# Patient Record
Sex: FEMALE | Race: BLACK OR AFRICAN AMERICAN | NOT HISPANIC OR LATINO | Employment: FULL TIME | URBAN - METROPOLITAN AREA
[De-identification: names, ages, dates, MRNs, and addresses within clinical notes are randomized per-mention and may not be internally consistent; named-entity substitution may affect disease eponyms.]

---

## 2024-02-23 ENCOUNTER — OFFICE VISIT (OUTPATIENT)
Dept: INTERNAL MEDICINE | Facility: CLINIC | Age: 69
End: 2024-02-23
Payer: MEDICARE

## 2024-02-23 ENCOUNTER — LAB VISIT (OUTPATIENT)
Dept: LAB | Facility: HOSPITAL | Age: 69
End: 2024-02-23
Attending: FAMILY MEDICINE
Payer: MEDICARE

## 2024-02-23 VITALS
HEART RATE: 91 BPM | DIASTOLIC BLOOD PRESSURE: 90 MMHG | OXYGEN SATURATION: 97 % | SYSTOLIC BLOOD PRESSURE: 158 MMHG | TEMPERATURE: 98 F | BODY MASS INDEX: 24.01 KG/M2 | HEIGHT: 62 IN | WEIGHT: 130.5 LBS

## 2024-02-23 DIAGNOSIS — Z12.11 COLON CANCER SCREENING: ICD-10-CM

## 2024-02-23 DIAGNOSIS — Z78.0 POSTMENOPAUSAL: ICD-10-CM

## 2024-02-23 DIAGNOSIS — Z12.31 ENCOUNTER FOR SCREENING MAMMOGRAM FOR MALIGNANT NEOPLASM OF BREAST: ICD-10-CM

## 2024-02-23 DIAGNOSIS — E03.9 HYPOTHYROIDISM, UNSPECIFIED TYPE: ICD-10-CM

## 2024-02-23 DIAGNOSIS — Z00.00 ROUTINE ADULT HEALTH MAINTENANCE: Primary | ICD-10-CM

## 2024-02-23 DIAGNOSIS — Z00.00 ROUTINE ADULT HEALTH MAINTENANCE: ICD-10-CM

## 2024-02-23 LAB
ALBUMIN SERPL BCP-MCNC: 4.3 G/DL (ref 3.5–5.2)
ALP SERPL-CCNC: 63 U/L (ref 55–135)
ALT SERPL W/O P-5'-P-CCNC: 10 U/L (ref 10–44)
ANION GAP SERPL CALC-SCNC: 12 MMOL/L (ref 8–16)
AST SERPL-CCNC: 14 U/L (ref 10–40)
BASOPHILS # BLD AUTO: 0.09 K/UL (ref 0–0.2)
BASOPHILS NFR BLD: 1.2 % (ref 0–1.9)
BILIRUB SERPL-MCNC: 0.6 MG/DL (ref 0.1–1)
BUN SERPL-MCNC: 15 MG/DL (ref 8–23)
CALCIUM SERPL-MCNC: 10.4 MG/DL (ref 8.7–10.5)
CHLORIDE SERPL-SCNC: 105 MMOL/L (ref 95–110)
CHOLEST SERPL-MCNC: 230 MG/DL (ref 120–199)
CHOLEST/HDLC SERPL: 3.2 {RATIO} (ref 2–5)
CO2 SERPL-SCNC: 25 MMOL/L (ref 23–29)
CREAT SERPL-MCNC: 1.6 MG/DL (ref 0.5–1.4)
DIFFERENTIAL METHOD BLD: ABNORMAL
EOSINOPHIL # BLD AUTO: 0.1 K/UL (ref 0–0.5)
EOSINOPHIL NFR BLD: 1.2 % (ref 0–8)
ERYTHROCYTE [DISTWIDTH] IN BLOOD BY AUTOMATED COUNT: 12.9 % (ref 11.5–14.5)
EST. GFR  (NO RACE VARIABLE): 35.3 ML/MIN/1.73 M^2
GLUCOSE SERPL-MCNC: 92 MG/DL (ref 70–110)
HCT VFR BLD AUTO: 43.6 % (ref 37–48.5)
HDLC SERPL-MCNC: 73 MG/DL (ref 40–75)
HDLC SERPL: 31.7 % (ref 20–50)
HGB BLD-MCNC: 14.4 G/DL (ref 12–16)
IMM GRANULOCYTES # BLD AUTO: 0.03 K/UL (ref 0–0.04)
IMM GRANULOCYTES NFR BLD AUTO: 0.4 % (ref 0–0.5)
LDLC SERPL CALC-MCNC: 137 MG/DL (ref 63–159)
LYMPHOCYTES # BLD AUTO: 1.9 K/UL (ref 1–4.8)
LYMPHOCYTES NFR BLD: 25.8 % (ref 18–48)
MCH RBC QN AUTO: 33 PG (ref 27–31)
MCHC RBC AUTO-ENTMCNC: 33 G/DL (ref 32–36)
MCV RBC AUTO: 100 FL (ref 82–98)
MONOCYTES # BLD AUTO: 0.5 K/UL (ref 0.3–1)
MONOCYTES NFR BLD: 7.5 % (ref 4–15)
NEUTROPHILS # BLD AUTO: 4.6 K/UL (ref 1.8–7.7)
NEUTROPHILS NFR BLD: 63.9 % (ref 38–73)
NONHDLC SERPL-MCNC: 157 MG/DL
NRBC BLD-RTO: 0 /100 WBC
PLATELET # BLD AUTO: 286 K/UL (ref 150–450)
PMV BLD AUTO: 11.4 FL (ref 9.2–12.9)
POTASSIUM SERPL-SCNC: 4.4 MMOL/L (ref 3.5–5.1)
PROT SERPL-MCNC: 7.5 G/DL (ref 6–8.4)
RBC # BLD AUTO: 4.37 M/UL (ref 4–5.4)
SODIUM SERPL-SCNC: 142 MMOL/L (ref 136–145)
T4 FREE SERPL-MCNC: 0.87 NG/DL (ref 0.71–1.51)
TRIGL SERPL-MCNC: 100 MG/DL (ref 30–150)
TSH SERPL DL<=0.005 MIU/L-ACNC: 13.39 UIU/ML (ref 0.4–4)
WBC # BLD AUTO: 7.24 K/UL (ref 3.9–12.7)

## 2024-02-23 PROCEDURE — 80053 COMPREHEN METABOLIC PANEL: CPT | Performed by: FAMILY MEDICINE

## 2024-02-23 PROCEDURE — 99999 PR PBB SHADOW E&M-NEW PATIENT-LVL III: CPT | Mod: PBBFAC,,, | Performed by: FAMILY MEDICINE

## 2024-02-23 PROCEDURE — 36415 COLL VENOUS BLD VENIPUNCTURE: CPT | Mod: PO | Performed by: FAMILY MEDICINE

## 2024-02-23 PROCEDURE — 84439 ASSAY OF FREE THYROXINE: CPT | Performed by: FAMILY MEDICINE

## 2024-02-23 PROCEDURE — 99203 OFFICE O/P NEW LOW 30 MIN: CPT | Mod: PBBFAC,PO | Performed by: FAMILY MEDICINE

## 2024-02-23 PROCEDURE — 85025 COMPLETE CBC W/AUTO DIFF WBC: CPT | Performed by: FAMILY MEDICINE

## 2024-02-23 PROCEDURE — 99204 OFFICE O/P NEW MOD 45 MIN: CPT | Mod: S$PBB,,, | Performed by: FAMILY MEDICINE

## 2024-02-23 PROCEDURE — 80061 LIPID PANEL: CPT | Performed by: FAMILY MEDICINE

## 2024-02-23 PROCEDURE — 84443 ASSAY THYROID STIM HORMONE: CPT | Performed by: FAMILY MEDICINE

## 2024-02-23 RX ORDER — LEVOTHYROXINE SODIUM 75 UG/1
125 TABLET ORAL
COMMUNITY
Start: 2023-11-21 | End: 2024-02-23

## 2024-02-23 RX ORDER — ASPIRIN 81 MG/1
81 TABLET ORAL
COMMUNITY

## 2024-02-23 RX ORDER — LEVOTHYROXINE SODIUM 125 UG/1
125 TABLET ORAL
Qty: 90 TABLET | Refills: 1 | Status: SHIPPED | OUTPATIENT
Start: 2024-02-23 | End: 2025-02-22

## 2024-02-25 NOTE — PROGRESS NOTES
"Subjective:      Patient ID: Althea Rowland is a 68 y.o. female.    Chief Complaint: Establish Care      Patient here for routine physical.  Reports it has been several years since she has seen a doctor.  History of hypothyroidism, has been out of levothyroxine for several months      Review of Systems  Past Medical History:   Diagnosis Date    Brain aneurysm     Hypothyroidism           Past Surgical History:   Procedure Laterality Date     SECTION       History reviewed. No pertinent family history.  Social History     Socioeconomic History    Marital status:    Tobacco Use    Smoking status: Every Day     Types: Cigarettes     Review of patient's allergies indicates:   Allergen Reactions    Yellow dye Itching       Objective:       BP (!) 158/90 (BP Location: Right arm, Patient Position: Sitting, BP Method: Large (Manual))   Pulse 91   Temp 97.6 °F (36.4 °C) (Tympanic)   Ht 5' 2" (1.575 m)   Wt 59.2 kg (130 lb 8.2 oz)   SpO2 97%   BMI 23.87 kg/m²   Physical Exam  Assessment:     1. Routine adult health maintenance    2. Hypothyroidism, unspecified type    3. Colon cancer screening    4. Postmenopausal    5. Encounter for screening mammogram for malignant neoplasm of breast      Plan:   Routine adult health maintenance  -     Comprehensive Metabolic Panel; Future; Expected date: 2024  -     CBC Auto Differential; Future; Expected date: 2024  -     Lipid Panel; Future; Expected date: 2024  -     T4, Free; Future; Expected date: 2024  -     TSH; Future; Expected date: 2024  -     DXA Bone Density Axial Skeleton 1 or more sites; Future; Expected date: 2024  -     Mammo Digital Screening Bilat w/ Valentin; Future; Expected date: 2024    Hypothyroidism, unspecified type  -     CBC Auto Differential; Future; Expected date: 2024  -     Lipid Panel; Future; Expected date: 2024  -     T4, Free; Future; Expected date: 2024  -     TSH; Future; " Expected date: 02/23/2024    Colon cancer screening    Postmenopausal  -     DXA Bone Density Axial Skeleton 1 or more sites; Future; Expected date: 02/23/2024    Encounter for screening mammogram for malignant neoplasm of breast  -     Mammo Digital Screening Bilat w/ Valentin; Future; Expected date: 02/23/2024    Other orders  -     levothyroxine (SYNTHROID) 125 MCG tablet; Take 1 tablet (125 mcg total) by mouth before breakfast.  Dispense: 90 tablet; Refill: 1      Medication List with Changes/Refills   New Medications    LEVOTHYROXINE (SYNTHROID) 125 MCG TABLET    Take 1 tablet (125 mcg total) by mouth before breakfast.   Current Medications    ASPIRIN (ECOTRIN) 81 MG EC TABLET    Take 81 mg by mouth.    MULTIVITAMIN WITH MINERALS TABLET    Take 1 tablet by mouth once daily.   Discontinued Medications    LEVOTHYROXINE (SYNTHROID) 75 MCG TABLET    Take 125 mcg by mouth before breakfast.

## 2024-02-29 ENCOUNTER — OFFICE VISIT (OUTPATIENT)
Dept: INTERNAL MEDICINE | Facility: CLINIC | Age: 69
End: 2024-02-29
Payer: MEDICARE

## 2024-02-29 ENCOUNTER — LAB VISIT (OUTPATIENT)
Dept: LAB | Facility: HOSPITAL | Age: 69
End: 2024-02-29
Attending: FAMILY MEDICINE
Payer: MEDICARE

## 2024-02-29 VITALS
TEMPERATURE: 96 F | BODY MASS INDEX: 24.42 KG/M2 | OXYGEN SATURATION: 98 % | SYSTOLIC BLOOD PRESSURE: 153 MMHG | DIASTOLIC BLOOD PRESSURE: 99 MMHG | HEIGHT: 62 IN | WEIGHT: 132.69 LBS | HEART RATE: 84 BPM

## 2024-02-29 DIAGNOSIS — N28.9 RENAL INSUFFICIENCY: ICD-10-CM

## 2024-02-29 DIAGNOSIS — N28.9 RENAL INSUFFICIENCY: Primary | ICD-10-CM

## 2024-02-29 DIAGNOSIS — R35.0 URINARY FREQUENCY: ICD-10-CM

## 2024-02-29 DIAGNOSIS — I10 HYPERTENSION, UNSPECIFIED TYPE: ICD-10-CM

## 2024-02-29 LAB
AMORPH CRY UR QL COMP ASSIST: ABNORMAL
ANION GAP SERPL CALC-SCNC: 12 MMOL/L (ref 8–16)
BACTERIA #/AREA URNS AUTO: ABNORMAL /HPF
BILIRUB UR QL STRIP: NEGATIVE
BUN SERPL-MCNC: 19 MG/DL (ref 8–23)
CALCIUM SERPL-MCNC: 9.8 MG/DL (ref 8.7–10.5)
CHLORIDE SERPL-SCNC: 103 MMOL/L (ref 95–110)
CLARITY UR REFRACT.AUTO: ABNORMAL
CO2 SERPL-SCNC: 28 MMOL/L (ref 23–29)
COLOR UR AUTO: ABNORMAL
CREAT SERPL-MCNC: 1.2 MG/DL (ref 0.5–1.4)
EST. GFR  (NO RACE VARIABLE): 49.9 ML/MIN/1.73 M^2
GLUCOSE SERPL-MCNC: 72 MG/DL (ref 70–110)
GLUCOSE UR QL STRIP: NEGATIVE
HGB UR QL STRIP: NEGATIVE
KETONES UR QL STRIP: NEGATIVE
LEUKOCYTE ESTERASE UR QL STRIP: ABNORMAL
MICROSCOPIC COMMENT: ABNORMAL
NITRITE UR QL STRIP: NEGATIVE
PH UR STRIP: 6 [PH] (ref 5–8)
POTASSIUM SERPL-SCNC: 3.9 MMOL/L (ref 3.5–5.1)
PROT UR QL STRIP: ABNORMAL
SODIUM SERPL-SCNC: 143 MMOL/L (ref 136–145)
SP GR UR STRIP: 1.02 (ref 1–1.03)
SQUAMOUS #/AREA URNS AUTO: 13 /HPF
URN SPEC COLLECT METH UR: ABNORMAL
WBC #/AREA URNS AUTO: 9 /HPF (ref 0–5)

## 2024-02-29 PROCEDURE — 99213 OFFICE O/P EST LOW 20 MIN: CPT | Mod: PBBFAC,PO | Performed by: FAMILY MEDICINE

## 2024-02-29 PROCEDURE — 99999 PR PBB SHADOW E&M-EST. PATIENT-LVL III: CPT | Mod: PBBFAC,,, | Performed by: FAMILY MEDICINE

## 2024-02-29 PROCEDURE — 36415 COLL VENOUS BLD VENIPUNCTURE: CPT | Mod: PO | Performed by: FAMILY MEDICINE

## 2024-02-29 PROCEDURE — 99214 OFFICE O/P EST MOD 30 MIN: CPT | Mod: S$PBB,,, | Performed by: FAMILY MEDICINE

## 2024-02-29 PROCEDURE — 80048 BASIC METABOLIC PNL TOTAL CA: CPT | Performed by: FAMILY MEDICINE

## 2024-02-29 PROCEDURE — 87086 URINE CULTURE/COLONY COUNT: CPT | Performed by: FAMILY MEDICINE

## 2024-02-29 PROCEDURE — 81001 URINALYSIS AUTO W/SCOPE: CPT | Performed by: FAMILY MEDICINE

## 2024-02-29 RX ORDER — AMLODIPINE BESYLATE 5 MG/1
5 TABLET ORAL DAILY
Qty: 30 TABLET | Refills: 11 | Status: SHIPPED | OUTPATIENT
Start: 2024-02-29 | End: 2024-03-24 | Stop reason: SDUPTHER

## 2024-02-29 NOTE — PROGRESS NOTES
"Subjective:      Patient ID: Althea Rowland is a 68 y.o. female.    Chief Complaint: Follow-up      The patient is here today for routine follow up. Labs drawn earlier discussed today with the patient.  GFR 35 which is new, only previous comparison in  her GFR was 110  Her TSH was elevated as expected as she had been out of her levothyroxine for several months  Blood pressure still elevated today even on recheck    Follow-up      Review of Systems   Constitutional:  Negative for activity change and appetite change.   Genitourinary:  Positive for frequency and urgency.     Past Medical History:   Diagnosis Date    Brain aneurysm 2004    Hypothyroidism           Past Surgical History:   Procedure Laterality Date     SECTION       History reviewed. No pertinent family history.  Social History     Socioeconomic History    Marital status:    Tobacco Use    Smoking status: Every Day     Types: Cigarettes    Smokeless tobacco: Never     Review of patient's allergies indicates:   Allergen Reactions    Yellow dye Itching       Objective:       BP (!) 153/99 (BP Location: Right arm, Patient Position: Sitting, BP Method: Medium (Automatic))   Pulse 84   Temp 96.3 °F (35.7 °C) (Tympanic)   Ht 5' 2" (1.575 m)   Wt 60.2 kg (132 lb 11.5 oz)   SpO2 98%   BMI 24.27 kg/m²   Physical Exam  Constitutional:       General: She is not in acute distress.     Appearance: Normal appearance. She is well-developed. She is not ill-appearing or diaphoretic.   Neurological:      Mental Status: She is alert and oriented to person, place, and time.   Psychiatric:         Mood and Affect: Mood normal.         Behavior: Behavior normal.         Thought Content: Thought content normal.         Judgment: Judgment normal.       Assessment:     1. Renal insufficiency    2. Hypertension, unspecified type    3. Urinary frequency      Plan:   Renal insufficiency  -     Basic Metabolic Panel; Future; Expected date: " 02/29/2024    Hypertension, unspecified type    Urinary frequency  -     Urinalysis; Future; Expected date: 02/29/2024  -     Urine culture; Future; Expected date: 02/29/2024    Other orders  -     amLODIPine (NORVASC) 5 MG tablet; Take 1 tablet (5 mg total) by mouth once daily.  Dispense: 30 tablet; Refill: 11    Will check urine today, recheck renal function, plan follow-up on thyroid levels in about 3 months  Will start Norvasc-2 week blood pressure nurse visit    Medication List with Changes/Refills   New Medications    AMLODIPINE (NORVASC) 5 MG TABLET    Take 1 tablet (5 mg total) by mouth once daily.   Current Medications    ASPIRIN (ECOTRIN) 81 MG EC TABLET    Take 81 mg by mouth.    LEVOTHYROXINE (SYNTHROID) 125 MCG TABLET    Take 1 tablet (125 mcg total) by mouth before breakfast.    MULTIVITAMIN WITH MINERALS TABLET    Take 1 tablet by mouth once daily.

## 2024-03-01 DIAGNOSIS — E03.9 HYPOTHYROIDISM, UNSPECIFIED TYPE: ICD-10-CM

## 2024-03-01 DIAGNOSIS — N28.9 RENAL INSUFFICIENCY: Primary | ICD-10-CM

## 2024-03-02 LAB — BACTERIA UR CULT: NO GROWTH

## 2024-03-14 ENCOUNTER — HOSPITAL ENCOUNTER (OUTPATIENT)
Dept: RADIOLOGY | Facility: HOSPITAL | Age: 69
Discharge: HOME | End: 2024-03-14
Attending: FAMILY MEDICINE
Payer: MEDICARE

## 2024-03-14 ENCOUNTER — CLINICAL SUPPORT (OUTPATIENT)
Dept: INTERNAL MEDICINE | Facility: CLINIC | Age: 69
End: 2024-03-14
Payer: MEDICARE

## 2024-03-14 VITALS — DIASTOLIC BLOOD PRESSURE: 98 MMHG | SYSTOLIC BLOOD PRESSURE: 162 MMHG | HEART RATE: 77 BPM

## 2024-03-14 DIAGNOSIS — Z12.31 ENCOUNTER FOR SCREENING MAMMOGRAM FOR MALIGNANT NEOPLASM OF BREAST: ICD-10-CM

## 2024-03-14 DIAGNOSIS — Z00.00 ROUTINE ADULT HEALTH MAINTENANCE: ICD-10-CM

## 2024-03-14 DIAGNOSIS — I10 HYPERTENSION, UNSPECIFIED TYPE: Primary | ICD-10-CM

## 2024-03-14 PROCEDURE — 99999 PR PBB SHADOW E&M-EST. PATIENT-LVL I: CPT | Mod: PBBFAC,,,

## 2024-03-14 PROCEDURE — 77067 SCR MAMMO BI INCL CAD: CPT | Mod: 26,,, | Performed by: RADIOLOGY

## 2024-03-14 PROCEDURE — 77063 BREAST TOMOSYNTHESIS BI: CPT | Mod: TC

## 2024-03-14 PROCEDURE — 99211 OFF/OP EST MAY X REQ PHY/QHP: CPT | Mod: PBBFAC,25

## 2024-03-14 PROCEDURE — 77063 BREAST TOMOSYNTHESIS BI: CPT | Mod: 26,,, | Performed by: RADIOLOGY

## 2024-03-14 NOTE — PROGRESS NOTES
Pt here for nurse visit  Bp 162/98 pulse 77  No acute distress noted  States she is taking bp medication as ordered   Also states he did drink a couple cups of coffee this morning.  Please advise

## 2024-03-24 ENCOUNTER — TELEPHONE (OUTPATIENT)
Dept: INTERNAL MEDICINE | Facility: CLINIC | Age: 69
End: 2024-03-24
Payer: MEDICARE

## 2024-03-24 RX ORDER — AMLODIPINE BESYLATE 10 MG/1
10 TABLET ORAL DAILY
Qty: 30 TABLET | Refills: 1 | Status: SHIPPED | OUTPATIENT
Start: 2024-03-24 | End: 2024-04-16 | Stop reason: SDUPTHER

## 2024-03-24 NOTE — TELEPHONE ENCOUNTER
Notify I have increased her amlodipine dose to 10 mg, ask her to follow-up again in 2 more weeks for repeat check on her blood pressure

## 2024-03-24 NOTE — TELEPHONE ENCOUNTER
----- Message from Malina Wright LPN sent at 3/14/2024  1:30 PM CDT -----  Pt here for nurse visit  Bp 162/98 pulse 77  No acute distress noted  States she is taking bp medication as ordered   Also states he did drink a couple cups of coffee this morning.  Please advise

## 2024-04-11 ENCOUNTER — TELEPHONE (OUTPATIENT)
Dept: INTERNAL MEDICINE | Facility: CLINIC | Age: 69
End: 2024-04-11
Payer: MEDICARE

## 2024-04-11 NOTE — TELEPHONE ENCOUNTER
----- Message from Evelin Harvey sent at 4/11/2024  3:54 PM CDT -----  Contact: Althea  .Patient is calling to speak with the nurse regarding nurse visit . Reports needing to schedule another visit. Please give patient a call back at   [phone]

## 2024-04-16 ENCOUNTER — OFFICE VISIT (OUTPATIENT)
Dept: INTERNAL MEDICINE | Facility: CLINIC | Age: 69
End: 2024-04-16
Payer: MEDICARE

## 2024-04-16 ENCOUNTER — LAB VISIT (OUTPATIENT)
Dept: LAB | Facility: HOSPITAL | Age: 69
End: 2024-04-16
Attending: FAMILY MEDICINE
Payer: MEDICARE

## 2024-04-16 VITALS
SYSTOLIC BLOOD PRESSURE: 150 MMHG | BODY MASS INDEX: 22.96 KG/M2 | HEART RATE: 79 BPM | WEIGHT: 124.75 LBS | HEIGHT: 62 IN | DIASTOLIC BLOOD PRESSURE: 90 MMHG | TEMPERATURE: 98 F | OXYGEN SATURATION: 95 %

## 2024-04-16 DIAGNOSIS — N28.9 RENAL INSUFFICIENCY: ICD-10-CM

## 2024-04-16 DIAGNOSIS — E03.9 HYPOTHYROIDISM, UNSPECIFIED TYPE: ICD-10-CM

## 2024-04-16 DIAGNOSIS — I10 HYPERTENSION, UNSPECIFIED TYPE: Primary | ICD-10-CM

## 2024-04-16 LAB
ANION GAP SERPL CALC-SCNC: 10 MMOL/L (ref 8–16)
BUN SERPL-MCNC: 17 MG/DL (ref 8–23)
CALCIUM SERPL-MCNC: 10.4 MG/DL (ref 8.7–10.5)
CHLORIDE SERPL-SCNC: 104 MMOL/L (ref 95–110)
CO2 SERPL-SCNC: 24 MMOL/L (ref 23–29)
CREAT SERPL-MCNC: 1.1 MG/DL (ref 0.5–1.4)
EST. GFR  (NO RACE VARIABLE): 55.1 ML/MIN/1.73 M^2
GLUCOSE SERPL-MCNC: 95 MG/DL (ref 70–110)
POTASSIUM SERPL-SCNC: 4.1 MMOL/L (ref 3.5–5.1)
SODIUM SERPL-SCNC: 138 MMOL/L (ref 136–145)
T4 FREE SERPL-MCNC: 1.21 NG/DL (ref 0.71–1.51)
TSH SERPL DL<=0.005 MIU/L-ACNC: 0.11 UIU/ML (ref 0.4–4)

## 2024-04-16 PROCEDURE — 36415 COLL VENOUS BLD VENIPUNCTURE: CPT | Mod: PO | Performed by: FAMILY MEDICINE

## 2024-04-16 PROCEDURE — 84439 ASSAY OF FREE THYROXINE: CPT | Performed by: FAMILY MEDICINE

## 2024-04-16 PROCEDURE — 80048 BASIC METABOLIC PNL TOTAL CA: CPT | Performed by: FAMILY MEDICINE

## 2024-04-16 PROCEDURE — 84443 ASSAY THYROID STIM HORMONE: CPT | Performed by: FAMILY MEDICINE

## 2024-04-16 PROCEDURE — 99214 OFFICE O/P EST MOD 30 MIN: CPT | Mod: S$PBB,,, | Performed by: FAMILY MEDICINE

## 2024-04-16 PROCEDURE — 99999 PR PBB SHADOW E&M-EST. PATIENT-LVL III: CPT | Mod: PBBFAC,,, | Performed by: FAMILY MEDICINE

## 2024-04-16 PROCEDURE — 99213 OFFICE O/P EST LOW 20 MIN: CPT | Mod: PBBFAC,PO | Performed by: FAMILY MEDICINE

## 2024-04-16 RX ORDER — AMLODIPINE BESYLATE 10 MG/1
10 TABLET ORAL DAILY
Qty: 30 TABLET | Refills: 1 | Status: SHIPPED | OUTPATIENT
Start: 2024-04-16 | End: 2025-04-16

## 2024-04-16 RX ORDER — HYDROCHLOROTHIAZIDE 12.5 MG/1
12.5 TABLET ORAL DAILY
Qty: 30 TABLET | Refills: 11 | Status: SHIPPED | OUTPATIENT
Start: 2024-04-16 | End: 2025-04-16

## 2024-04-16 NOTE — PROGRESS NOTES
"Subjective:      Patient ID: Althea Rowland is a 69 y.o. female.    Chief Complaint: Follow-up      Patient here for routien follow up.   When she has checked blood pressure recently it has remained elevated with similar readings to today.   She is taking amlodipine daily.   Due to recheck labs today.    Follow-up      Review of Systems   Constitutional:  Negative for activity change and appetite change.   Respiratory:  Negative for shortness of breath.    Cardiovascular:  Negative for leg swelling.   Endocrine: Negative for cold intolerance and heat intolerance.     Past Medical History:   Diagnosis Date    Brain aneurysm 2004    Hypothyroidism           Past Surgical History:   Procedure Laterality Date     SECTION       No family history on file.  Social History     Socioeconomic History    Marital status:    Tobacco Use    Smoking status: Every Day     Types: Cigarettes    Smokeless tobacco: Never     Review of patient's allergies indicates:   Allergen Reactions    Yellow dye Itching       Objective:       BP (!) 150/90 (BP Location: Left arm, Patient Position: Sitting, BP Method: Large (Manual))   Pulse 79   Temp 97.9 °F (36.6 °C) (Tympanic)   Ht 5' 2" (1.575 m)   Wt 56.6 kg (124 lb 12.5 oz)   SpO2 95%   BMI 22.82 kg/m²   Physical Exam  Constitutional:       General: She is not in acute distress.     Appearance: Normal appearance. She is well-developed. She is not ill-appearing or diaphoretic.   Cardiovascular:      Rate and Rhythm: Normal rate and regular rhythm.      Heart sounds: Normal heart sounds.   Pulmonary:      Effort: Pulmonary effort is normal.      Breath sounds: Normal breath sounds.   Neurological:      Mental Status: She is alert and oriented to person, place, and time.   Psychiatric:         Mood and Affect: Mood normal.         Behavior: Behavior normal.         Thought Content: Thought content normal.         Judgment: Judgment normal.       Assessment:     1. " Hypertension, unspecified type    2. Hypothyroidism, unspecified type      Plan:   Hypertension, unspecified type    Hypothyroidism, unspecified type    Other orders  -     hydroCHLOROthiazide (HYDRODIURIL) 12.5 MG Tab; Take 1 tablet (12.5 mg total) by mouth once daily.  Dispense: 30 tablet; Refill: 11  -     amLODIPine (NORVASC) 10 MG tablet; Take 1 tablet (10 mg total) by mouth once daily.  Dispense: 30 tablet; Refill: 1  -     Cancel: Ambulatory referral/consult to Endo Procedure ; Future; Expected date: 04/17/2024    Discussed colon CA screening -  just had CABG and not able to drive her yet, will let me know when she is able to have colonoscopy - due last year  Will have labs today previously ordered - bmp, tsh, t4  2 week bp recheck  Medication List with Changes/Refills   New Medications    HYDROCHLOROTHIAZIDE (HYDRODIURIL) 12.5 MG TAB    Take 1 tablet (12.5 mg total) by mouth once daily.   Current Medications    ASPIRIN (ECOTRIN) 81 MG EC TABLET    Take 81 mg by mouth.    LEVOTHYROXINE (SYNTHROID) 125 MCG TABLET    Take 1 tablet (125 mcg total) by mouth before breakfast.    MULTIVITAMIN WITH MINERALS TABLET    Take 1 tablet by mouth once daily.   Changed and/or Refilled Medications    Modified Medication Previous Medication    AMLODIPINE (NORVASC) 10 MG TABLET amLODIPine (NORVASC) 10 MG tablet       Take 1 tablet (10 mg total) by mouth once daily.    Take 1 tablet (10 mg total) by mouth once daily.

## 2024-04-22 ENCOUNTER — TELEPHONE (OUTPATIENT)
Dept: INTERNAL MEDICINE | Facility: CLINIC | Age: 69
End: 2024-04-22
Payer: MEDICARE

## 2024-04-22 NOTE — TELEPHONE ENCOUNTER
----- Message from Gavin Lopez sent at 4/22/2024 12:14 PM CDT -----  Contact: Althea  .Type:  Test Results    Who Called: Althea  Name of Test (Lab/Mammo/Etc): Lab  Date of Test: 04/16  Ordering Provider: Del Ridley  Where the test was performed: [facility]  Would the patient rather a call back or a response via [facility]? Call back   Best Call Back Number:  [phone]  Additional Information:  pt wants to know the strength of Calcium Vitamin she suppose to take, medication not listed in pt chart.                    Thanks  KT

## 2024-04-30 ENCOUNTER — TELEPHONE (OUTPATIENT)
Dept: INTERNAL MEDICINE | Facility: CLINIC | Age: 69
End: 2024-04-30

## 2024-04-30 ENCOUNTER — CLINICAL SUPPORT (OUTPATIENT)
Dept: INTERNAL MEDICINE | Facility: CLINIC | Age: 69
End: 2024-04-30
Payer: MEDICARE

## 2024-04-30 VITALS — DIASTOLIC BLOOD PRESSURE: 84 MMHG | SYSTOLIC BLOOD PRESSURE: 126 MMHG | HEART RATE: 75 BPM | OXYGEN SATURATION: 97 %

## 2024-04-30 DIAGNOSIS — Z01.30 BP CHECK: Primary | ICD-10-CM

## 2024-04-30 PROCEDURE — 99999 PR PBB SHADOW E&M-EST. PATIENT-LVL III: CPT | Mod: PBBFAC,,,

## 2024-04-30 PROCEDURE — 99213 OFFICE O/P EST LOW 20 MIN: CPT | Mod: PBBFAC,PO

## 2024-04-30 NOTE — PROGRESS NOTES
Patient came into clinic for BP check 126/84 with pulse of 75. Pt denies any pain. Took Amlodipine but stated she has not picked up HCTZ yet.

## 2024-07-22 ENCOUNTER — TELEPHONE (OUTPATIENT)
Dept: INTERNAL MEDICINE | Facility: CLINIC | Age: 69
End: 2024-07-22
Payer: MEDICARE

## 2024-07-22 NOTE — TELEPHONE ENCOUNTER
Spoke with pt, she stated she is having some dizziness and feels that it is not an emergency and can wait til Wednesday to see CAITLIN Johnson. Pt informed if any symptoms get worse to go to  for sooner treatment. Pt verbalized understanding and scheduled appt as requested.

## 2024-07-22 NOTE — TELEPHONE ENCOUNTER
----- Message from Rosalinda Caraballo sent at 7/22/2024  9:38 AM CDT -----  Contact: Luis Alberto Oh [phone]  Symptoms: Weakness, Dizziness  Outcome: Schedule a same-day appointment or talk to a nurse or provider within 1 hour.  Reason: Caller denied all higher acuity questions        Pt calling to speak with the nurse to be seen today with any at the location for the symptoms listed above. Please call to advise.

## 2024-07-24 ENCOUNTER — LAB VISIT (OUTPATIENT)
Dept: LAB | Facility: HOSPITAL | Age: 69
End: 2024-07-24
Attending: PHYSICIAN ASSISTANT
Payer: MEDICARE

## 2024-07-24 ENCOUNTER — TELEPHONE (OUTPATIENT)
Dept: INTERNAL MEDICINE | Facility: CLINIC | Age: 69
End: 2024-07-24

## 2024-07-24 ENCOUNTER — OFFICE VISIT (OUTPATIENT)
Dept: INTERNAL MEDICINE | Facility: CLINIC | Age: 69
End: 2024-07-24
Payer: MEDICARE

## 2024-07-24 VITALS — DIASTOLIC BLOOD PRESSURE: 100 MMHG | SYSTOLIC BLOOD PRESSURE: 162 MMHG

## 2024-07-24 VITALS
HEART RATE: 78 BPM | BODY MASS INDEX: 20.82 KG/M2 | DIASTOLIC BLOOD PRESSURE: 106 MMHG | WEIGHT: 113.13 LBS | SYSTOLIC BLOOD PRESSURE: 180 MMHG | RESPIRATION RATE: 20 BRPM | OXYGEN SATURATION: 98 % | TEMPERATURE: 97 F | HEIGHT: 62 IN

## 2024-07-24 DIAGNOSIS — M89.9 DISORDER OF BONE, UNSPECIFIED: ICD-10-CM

## 2024-07-24 DIAGNOSIS — M85.89 OSTEOPENIA OF MULTIPLE SITES: ICD-10-CM

## 2024-07-24 DIAGNOSIS — R53.83 FATIGUE, UNSPECIFIED TYPE: Primary | ICD-10-CM

## 2024-07-24 DIAGNOSIS — R79.9 ABNORMAL FINDING OF BLOOD CHEMISTRY, UNSPECIFIED: ICD-10-CM

## 2024-07-24 DIAGNOSIS — I10 ESSENTIAL HYPERTENSION: ICD-10-CM

## 2024-07-24 DIAGNOSIS — Z86.010 PERSONAL HISTORY OF COLONIC POLYPS: ICD-10-CM

## 2024-07-24 DIAGNOSIS — R53.83 FATIGUE, UNSPECIFIED TYPE: ICD-10-CM

## 2024-07-24 DIAGNOSIS — E03.9 HYPOTHYROIDISM (ACQUIRED): ICD-10-CM

## 2024-07-24 DIAGNOSIS — N28.9 RENAL INSUFFICIENCY: ICD-10-CM

## 2024-07-24 PROBLEM — R06.83 HABITUAL SNORING: Status: ACTIVE | Noted: 2018-12-10

## 2024-07-24 PROBLEM — E78.49 OTHER HYPERLIPIDEMIA: Status: ACTIVE | Noted: 2018-12-10

## 2024-07-24 LAB
25(OH)D3+25(OH)D2 SERPL-MCNC: 43 NG/ML (ref 30–96)
ALBUMIN SERPL BCP-MCNC: 3.9 G/DL (ref 3.5–5.2)
ALP SERPL-CCNC: 62 U/L (ref 55–135)
ALT SERPL W/O P-5'-P-CCNC: 7 U/L (ref 10–44)
ANION GAP SERPL CALC-SCNC: 9 MMOL/L (ref 8–16)
AST SERPL-CCNC: 11 U/L (ref 10–40)
BILIRUB SERPL-MCNC: 0.7 MG/DL (ref 0.1–1)
BUN SERPL-MCNC: 17 MG/DL (ref 8–23)
CALCIUM SERPL-MCNC: 9.8 MG/DL (ref 8.7–10.5)
CHLORIDE SERPL-SCNC: 106 MMOL/L (ref 95–110)
CO2 SERPL-SCNC: 27 MMOL/L (ref 23–29)
CREAT SERPL-MCNC: 1.2 MG/DL (ref 0.5–1.4)
EST. GFR  (NO RACE VARIABLE): 49.6 ML/MIN/1.73 M^2
ESTIMATED AVG GLUCOSE: 111 MG/DL (ref 68–131)
GLUCOSE SERPL-MCNC: 97 MG/DL (ref 70–110)
HBA1C MFR BLD: 5.5 % (ref 4–5.6)
HCV AB SERPL QL IA: NORMAL
POTASSIUM SERPL-SCNC: 4.1 MMOL/L (ref 3.5–5.1)
PROT SERPL-MCNC: 7 G/DL (ref 6–8.4)
SODIUM SERPL-SCNC: 142 MMOL/L (ref 136–145)
T4 FREE SERPL-MCNC: 1.35 NG/DL (ref 0.71–1.51)
TSH SERPL DL<=0.005 MIU/L-ACNC: 0.14 UIU/ML (ref 0.4–4)

## 2024-07-24 PROCEDURE — 36415 COLL VENOUS BLD VENIPUNCTURE: CPT | Mod: PO | Performed by: PHYSICIAN ASSISTANT

## 2024-07-24 PROCEDURE — 86803 HEPATITIS C AB TEST: CPT | Performed by: PHYSICIAN ASSISTANT

## 2024-07-24 PROCEDURE — 84439 ASSAY OF FREE THYROXINE: CPT | Performed by: PHYSICIAN ASSISTANT

## 2024-07-24 PROCEDURE — 80053 COMPREHEN METABOLIC PANEL: CPT | Performed by: PHYSICIAN ASSISTANT

## 2024-07-24 PROCEDURE — 83036 HEMOGLOBIN GLYCOSYLATED A1C: CPT | Performed by: PHYSICIAN ASSISTANT

## 2024-07-24 PROCEDURE — 99214 OFFICE O/P EST MOD 30 MIN: CPT | Mod: S$PBB,,, | Performed by: PHYSICIAN ASSISTANT

## 2024-07-24 PROCEDURE — G2211 COMPLEX E/M VISIT ADD ON: HCPCS | Mod: S$PBB,,, | Performed by: PHYSICIAN ASSISTANT

## 2024-07-24 PROCEDURE — 99214 OFFICE O/P EST MOD 30 MIN: CPT | Mod: PBBFAC,PO | Performed by: PHYSICIAN ASSISTANT

## 2024-07-24 PROCEDURE — 82306 VITAMIN D 25 HYDROXY: CPT | Performed by: PHYSICIAN ASSISTANT

## 2024-07-24 PROCEDURE — 84443 ASSAY THYROID STIM HORMONE: CPT | Performed by: PHYSICIAN ASSISTANT

## 2024-07-24 PROCEDURE — 99999 PR PBB SHADOW E&M-EST. PATIENT-LVL IV: CPT | Mod: PBBFAC,,, | Performed by: PHYSICIAN ASSISTANT

## 2024-07-24 RX ORDER — OLMESARTAN MEDOXOMIL 20 MG/1
20 TABLET ORAL DAILY
Qty: 90 TABLET | Refills: 3 | Status: SHIPPED | OUTPATIENT
Start: 2024-07-24 | End: 2025-07-24

## 2024-07-24 RX ORDER — AMLODIPINE BESYLATE 5 MG/1
5 TABLET ORAL DAILY
Qty: 90 TABLET | Refills: 3 | Status: SHIPPED | OUTPATIENT
Start: 2024-07-24 | End: 2025-07-24

## 2024-07-24 NOTE — ASSESSMENT & PLAN NOTE
/100, restart amlodipine 5mg and start olmesartan for kidney protection, RTC 2 weeks to review BP and lab work  
Check renal function today, encouraged increasing water daily, BP control and refraining from using NSAIDs  
Last TSH extremely elevated, recheck today  
WAI signed from [facility]  
100% of the time/able to follow single-step instructions

## 2024-07-24 NOTE — TELEPHONE ENCOUNTER
Retaken BP in left arm reading manually 160/100  pulse 78 from left middle finger R  20  even and unlabored : CHAN Pastor NP . Please review and advices

## 2024-07-24 NOTE — PROGRESS NOTES
Subjective:       Patient ID: Althea Rowland is a 69 y.o. female.    Chief Complaint: Fatigue      HPI  70 y/o female with HTN, hypothyroid presents to clinic with c/o fatigue, decreased appetite that waxes and wanes. Started a few weeks ago. She is not currently taking HTN medication, apparently she thought at her previous visit that medication was stopped. She takes her levothyroxine daily.     Review of Systems   Constitutional:  Positive for fatigue. Negative for chills and fever.   Respiratory:  Negative for shortness of breath.    Cardiovascular:  Negative for chest pain.   Neurological:  Negative for dizziness, light-headedness and headaches.       Objective:      Physical Exam  Vitals and nursing note reviewed.   Constitutional:       General: She is not in acute distress.     Appearance: She is well-developed.   HENT:      Head: Normocephalic and atraumatic.   Eyes:      General: Lids are normal. No scleral icterus.     Extraocular Movements: Extraocular movements intact.      Conjunctiva/sclera: Conjunctivae normal.   Cardiovascular:      Rate and Rhythm: Normal rate and regular rhythm.   Pulmonary:      Effort: Pulmonary effort is normal.      Breath sounds: Normal breath sounds. No decreased breath sounds, wheezing, rhonchi or rales.   Neurological:      Mental Status: She is alert.      Cranial Nerves: No cranial nerve deficit.   Psychiatric:         Mood and Affect: Mood and affect normal.         Assessment:       1. Fatigue, unspecified type    2. Essential hypertension    3. Renal insufficiency    4. Hypothyroidism (acquired)    5. Osteopenia of multiple sites    6. Personal history of colonic polyps    7. Abnormal finding of blood chemistry, unspecified    8. Disorder of bone, unspecified        Plan:   1. Fatigue, unspecified type  -     Vitamin D; Future; Expected date: 07/24/2024    2. Essential hypertension  Assessment & Plan:  /100, restart amlodipine 5mg and start olmesartan for  kidney protection, RTC 2 weeks to review BP and lab work    Orders:  -     Hemoglobin A1C; Future; Expected date: 07/24/2024  -     olmesartan (BENICAR) 20 MG tablet; Take 1 tablet (20 mg total) by mouth once daily.  Dispense: 90 tablet; Refill: 3  -     Hepatitis C Antibody; Future; Expected date: 07/24/2024  -     amLODIPine (NORVASC) 5 MG tablet; Take 1 tablet (5 mg total) by mouth once daily.  Dispense: 90 tablet; Refill: 3    3. Renal insufficiency  Assessment & Plan:  Check renal function today, encouraged increasing water daily, BP control and refraining from using NSAIDs    Orders:  -     Comprehensive Metabolic Panel; Future; Expected date: 07/24/2024    4. Hypothyroidism (acquired)  Assessment & Plan:  Last TSH extremely elevated, recheck today    Orders:  -     TSH; Future; Expected date: 07/24/2024  -     T4, Free; Future; Expected date: 07/24/2024    5. Osteopenia of multiple sites  Overview:  DEXA 3/2024    Orders:  -     Vitamin D; Future; Expected date: 07/24/2024    6. Personal history of colonic polyps  Assessment & Plan:  WAI signed from last C-scope done at [facility]    Orders:  -     Ambulatory referral/consult to Endo Procedure     7. Abnormal finding of blood chemistry, unspecified  -     Hemoglobin A1C; Future; Expected date: 07/24/2024    8. Disorder of bone, unspecified  -     Vitamin D; Future; Expected date: 07/24/2024        NF labs today  C-scope ordered  F/u with me 2 weeks      Visit today included increased complexity associated with the care of the episodic problem HTN, which was addressed while instituting co-management of the longitudinal care of the patient due to the serious and/or complex managed problem(s) .    I have evaluated and discussed management associated with medical care services that serve as the continuing focal point for all needed health care services and/or with medical care services that are part of ongoing care related to my patient's single,  serious condition or a complex condition(s).    I am providing ongoing care and I am the primary care provider for this patient, and they are being managed, monitored, and/or observed for their chronic conditions over time.     I have addressed their ongoing health maintenance requirements and needs for all health care services and reviewed co-management plans provided by specialty providers when available.    Health Maintenance Due   Topic Date Due    Hepatitis C Screening  Never done    Colorectal Cancer Screening  Never done    RSV Vaccine (Age 60+ and Pregnant patients) (1 - 1-dose 60+ series) Never done    Shingles Vaccine (3 of 3) 10/05/2021    COVID-19 Vaccine (5 - 2023-24 season) 03/21/2024

## 2024-07-25 DIAGNOSIS — E03.9 HYPOTHYROIDISM (ACQUIRED): Primary | ICD-10-CM

## 2024-07-25 RX ORDER — LEVOTHYROXINE SODIUM 100 UG/1
100 TABLET ORAL
Qty: 30 TABLET | Refills: 2 | Status: SHIPPED | OUTPATIENT
Start: 2024-07-25

## 2024-07-25 NOTE — TELEPHONE ENCOUNTER
Spoke with pt agreed and verbalized understanding to an appt on 08/07/24 @ 8 am for BP check and taking medication for HTN

## 2024-07-29 ENCOUNTER — HOSPITAL ENCOUNTER (OUTPATIENT)
Dept: PREADMISSION TESTING | Facility: HOSPITAL | Age: 69
Discharge: HOME | End: 2024-07-29
Attending: INTERNAL MEDICINE
Payer: MEDICARE

## 2024-07-29 DIAGNOSIS — Z86.010 PERSONAL HISTORY OF COLONIC POLYPS: Primary | ICD-10-CM

## 2024-07-29 RX ORDER — SODIUM, POTASSIUM,MAG SULFATES 17.5-3.13G
1 SOLUTION, RECONSTITUTED, ORAL ORAL DAILY
Qty: 1 KIT | Refills: 0 | Status: SHIPPED | OUTPATIENT
Start: 2024-07-29 | End: 2024-07-31

## 2024-08-01 ENCOUNTER — PATIENT OUTREACH (OUTPATIENT)
Dept: ADMINISTRATIVE | Facility: HOSPITAL | Age: 69
End: 2024-08-01
Payer: MEDICARE

## 2024-08-01 NOTE — LETTER
AUTHORIZATION FOR RELEASE OF   CONFIDENTIAL INFORMATION    Dear Dr. Del Angel    We are seeing Althea Rowland, date of birth 1955, in the clinic at [facility]. Del Ridley MD is the patient's PCP. Althea Rowland has an outstanding lab/procedure at the time we reviewed her chart. In order to help keep her health information updated, she has authorized us to request the following medical record(s):             ( X )  COLONOSCOPY PATHOLOGY REPORT FOR 12/15/2008  AND REPEAT DATE                         Please fax to [facility] at [phone], [facility] Care Coordination Department.     Thank-you in advance for your assistance. If you have any questions or concerns, please contact me at  [phone].       Grisel Ellison MACC [facility]              Patient Name: Althea Rowland  : 1955  Patient Phone #: [phone]

## 2024-08-01 NOTE — LETTER
AUTHORIZATION FOR RELEASE OF   CONFIDENTIAL INFORMATION    Dear Dr. Del Angel    We are seeing Althea Rowland, date of birth 1955, in the clinic at [facility]. Del Ridley MD is the patient's PCP. Althea Rowland has an outstanding lab/procedure at the time we reviewed her chart. In order to help keep her health information updated, she has authorized us to request the following medical record(s):             ( X )  COLONOSCOPY PATHOLOGY REPORT FOR 12/15/2008                        Please fax to [facility] at [phone], Replaced by [facility] Care Coordination Department.     Thank-you in advance for your assistance. If you have any questions or concerns, please contact me at  [phone].       Grisel Ellison MACC [facility]              Patient Name: Althea Rowland  : 1955  Patient Phone #: [phone]

## 2024-08-07 ENCOUNTER — OFFICE VISIT (OUTPATIENT)
Dept: INTERNAL MEDICINE | Facility: CLINIC | Age: 69
End: 2024-08-07
Payer: MEDICARE

## 2024-08-07 VITALS
OXYGEN SATURATION: 98 % | SYSTOLIC BLOOD PRESSURE: 138 MMHG | DIASTOLIC BLOOD PRESSURE: 86 MMHG | TEMPERATURE: 98 F | HEART RATE: 76 BPM | WEIGHT: 114.44 LBS | BODY MASS INDEX: 20.93 KG/M2

## 2024-08-07 DIAGNOSIS — R53.83 FATIGUE, UNSPECIFIED TYPE: Primary | ICD-10-CM

## 2024-08-07 DIAGNOSIS — N18.31 CHRONIC KIDNEY DISEASE, STAGE 3A: ICD-10-CM

## 2024-08-07 DIAGNOSIS — I10 ESSENTIAL HYPERTENSION: ICD-10-CM

## 2024-08-07 PROCEDURE — 99999 PR PBB SHADOW E&M-EST. PATIENT-LVL IV: CPT | Mod: PBBFAC,,, | Performed by: PHYSICIAN ASSISTANT

## 2024-08-07 PROCEDURE — 99214 OFFICE O/P EST MOD 30 MIN: CPT | Mod: PBBFAC,PO | Performed by: PHYSICIAN ASSISTANT

## 2024-08-07 PROCEDURE — 99214 OFFICE O/P EST MOD 30 MIN: CPT | Mod: S$PBB,,, | Performed by: PHYSICIAN ASSISTANT

## 2024-08-07 PROCEDURE — G2211 COMPLEX E/M VISIT ADD ON: HCPCS | Mod: S$PBB,,, | Performed by: PHYSICIAN ASSISTANT

## 2024-08-08 PROBLEM — N18.31 CHRONIC KIDNEY DISEASE, STAGE 3A: Status: ACTIVE | Noted: 2024-08-08
